# Patient Record
Sex: MALE | Race: WHITE | NOT HISPANIC OR LATINO | URBAN - METROPOLITAN AREA
[De-identification: names, ages, dates, MRNs, and addresses within clinical notes are randomized per-mention and may not be internally consistent; named-entity substitution may affect disease eponyms.]

---
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
## 2022-06-10 NOTE — PROCEDURE: TREATMENT REGIMEN
Initiate Treatment: Oracea once daily \\nEpiduo forte q p.m.\\nPlexion wash qd
Fall
Samples Given: Oracea\\nEpiduo forte
Detail Level: Simple